# Patient Record
Sex: FEMALE | Race: ASIAN | Employment: FULL TIME | URBAN - METROPOLITAN AREA
[De-identification: names, ages, dates, MRNs, and addresses within clinical notes are randomized per-mention and may not be internally consistent; named-entity substitution may affect disease eponyms.]

---

## 2017-04-11 ENCOUNTER — OFFICE VISIT (OUTPATIENT)
Dept: RHEUMATOLOGY | Facility: CLINIC | Age: 57
End: 2017-04-11

## 2017-04-11 VITALS — DIASTOLIC BLOOD PRESSURE: 84 MMHG | SYSTOLIC BLOOD PRESSURE: 142 MMHG | HEART RATE: 60 BPM | RESPIRATION RATE: 16 BRPM

## 2017-04-11 DIAGNOSIS — K51.90 ULCERATIVE COLITIS WITHOUT COMPLICATIONS, UNSPECIFIED LOCATION (HCC): ICD-10-CM

## 2017-04-11 DIAGNOSIS — R76.8 AUTOANTIBODY TITER POSITIVE: Primary | ICD-10-CM

## 2017-04-11 PROCEDURE — 99244 OFF/OP CNSLTJ NEW/EST MOD 40: CPT | Performed by: INTERNAL MEDICINE

## 2017-04-11 NOTE — PROGRESS NOTES
[facility]  Report of Consultation    Aldona Bump Patient Status:  No patient class for patient encounter    1960 MRN [id number]   Location ED [facility] PCP Callie Costello MD     Date of Consult:  17    Reason for Consulta weight loss or weight gain. .  No current chest pain, shortness of breath, or palpitations. No current abdominal pain, nausea, diarrhea, or vomiting. No difficulty with urination. No blood in the stool or blood in urine.   No current difficulty with vision normal.  Her current exam is unremarkable. And she currently has no symptoms of any eye problems or any problems anywhere.   My impression is that she has a false positive P Anca test.  P-ANCA can be associated with vasculitis, kidney disease, or other int

## 2017-04-11 NOTE — PATIENT INSTRUCTIONS
Mrs Hua Kam has a history of ulcerative colitis. It is currently under good control. Last fall there was episode of right eye redness. Blood testing at that time showed a high level of P ANCA present. .  Rest of her testing was normal.  Her current exam

## 2022-11-08 ENCOUNTER — HOSPITAL ENCOUNTER (OUTPATIENT)
Dept: GENERAL RADIOLOGY | Age: 62
Discharge: HOME | End: 2022-11-08
Attending: SPECIALIST
Payer: COMMERCIAL

## 2022-11-08 ENCOUNTER — HOSPITAL ENCOUNTER (OUTPATIENT)
Dept: BONE DENSITY | Age: 62
Discharge: HOME | End: 2022-11-08
Attending: SPECIALIST
Payer: COMMERCIAL

## 2022-11-08 ENCOUNTER — APPOINTMENT (OUTPATIENT)
Dept: GENERAL RADIOLOGY | Age: 62
End: 2022-11-08
Attending: SPECIALIST
Payer: COMMERCIAL

## 2022-11-08 DIAGNOSIS — Z91.89 AT RISK FOR OSTEOPOROSIS IN PREMENOPAUSAL PATIENT: ICD-10-CM

## 2022-11-08 DIAGNOSIS — N95.9 AT RISK FOR OSTEOPOROSIS IN PREMENOPAUSAL PATIENT: ICD-10-CM

## 2022-11-08 PROCEDURE — 77080 DXA BONE DENSITY AXIAL: CPT | Performed by: SPECIALIST

## 2022-11-08 PROCEDURE — 73560 X-RAY EXAM OF KNEE 1 OR 2: CPT | Performed by: SPECIALIST

## 2023-05-02 DIAGNOSIS — J45.40 MODERATE PERSISTENT ASTHMA WITHOUT COMPLICATION: Primary | ICD-10-CM

## 2023-05-02 DIAGNOSIS — Z11.52 ENCOUNTER FOR SCREENING FOR COVID-19: ICD-10-CM

## 2023-05-08 ENCOUNTER — RT VISIT (OUTPATIENT)
Dept: RESPIRATORY THERAPY | Facility: HOSPITAL | Age: 63
End: 2023-05-08
Attending: STUDENT IN AN ORGANIZED HEALTH CARE EDUCATION/TRAINING PROGRAM
Payer: COMMERCIAL

## 2023-05-08 DIAGNOSIS — J45.40 MODERATE PERSISTENT ASTHMA WITHOUT COMPLICATION: ICD-10-CM

## 2023-05-08 PROCEDURE — 94060 EVALUATION OF WHEEZING: CPT

## 2023-05-08 PROCEDURE — 94726 PLETHYSMOGRAPHY LUNG VOLUMES: CPT

## 2023-05-08 PROCEDURE — 94729 DIFFUSING CAPACITY: CPT
